# Patient Record
Sex: MALE | Race: WHITE | NOT HISPANIC OR LATINO | Employment: FULL TIME | URBAN - METROPOLITAN AREA
[De-identification: names, ages, dates, MRNs, and addresses within clinical notes are randomized per-mention and may not be internally consistent; named-entity substitution may affect disease eponyms.]

---

## 2024-01-09 ENCOUNTER — HOSPITAL ENCOUNTER (INPATIENT)
Facility: CLINIC | Age: 49
LOS: 1 days | Discharge: HOME | DRG: 282 | End: 2024-01-10
Attending: EMERGENCY MEDICINE | Admitting: INTERNAL MEDICINE
Payer: COMMERCIAL

## 2024-01-09 ENCOUNTER — APPOINTMENT (OUTPATIENT)
Dept: CT IMAGING | Facility: CLINIC | Age: 49
DRG: 282 | End: 2024-01-09
Attending: EMERGENCY MEDICINE
Payer: COMMERCIAL

## 2024-01-09 DIAGNOSIS — R55 VASOVAGAL SYNCOPE: ICD-10-CM

## 2024-01-09 DIAGNOSIS — I46.9 CARDIAC ARREST (H): ICD-10-CM

## 2024-01-09 DIAGNOSIS — I21.4 NSTEMI (NON-ST ELEVATED MYOCARDIAL INFARCTION) (H): ICD-10-CM

## 2024-01-09 DIAGNOSIS — R79.89 ELEVATED TROPONIN: Primary | ICD-10-CM

## 2024-01-09 LAB
ALBUMIN SERPL BCG-MCNC: 4.4 G/DL (ref 3.5–5.2)
ALP SERPL-CCNC: 64 U/L (ref 40–150)
ALT SERPL W P-5'-P-CCNC: 25 U/L (ref 0–70)
ANION GAP SERPL CALCULATED.3IONS-SCNC: 12 MMOL/L (ref 7–15)
AST SERPL W P-5'-P-CCNC: 26 U/L (ref 0–45)
BASOPHILS # BLD AUTO: 0 10E3/UL (ref 0–0.2)
BASOPHILS NFR BLD AUTO: 0 %
BILIRUB SERPL-MCNC: 0.8 MG/DL
BUN SERPL-MCNC: 18.3 MG/DL (ref 6–20)
CALCIUM SERPL-MCNC: 9.2 MG/DL (ref 8.6–10)
CHLORIDE SERPL-SCNC: 104 MMOL/L (ref 98–107)
CREAT SERPL-MCNC: 1.01 MG/DL (ref 0.67–1.17)
DEPRECATED HCO3 PLAS-SCNC: 27 MMOL/L (ref 22–29)
EGFRCR SERPLBLD CKD-EPI 2021: >90 ML/MIN/1.73M2
EOSINOPHIL # BLD AUTO: 0 10E3/UL (ref 0–0.7)
EOSINOPHIL NFR BLD AUTO: 0 %
ERYTHROCYTE [DISTWIDTH] IN BLOOD BY AUTOMATED COUNT: 12.5 % (ref 10–15)
GLUCOSE SERPL-MCNC: 130 MG/DL (ref 70–99)
HCT VFR BLD AUTO: 45.7 % (ref 40–53)
HGB BLD-MCNC: 15.7 G/DL (ref 13.3–17.7)
HOLD SPECIMEN: NORMAL
IMM GRANULOCYTES # BLD: 0 10E3/UL
IMM GRANULOCYTES NFR BLD: 0 %
LACTATE SERPL-SCNC: 1.6 MMOL/L (ref 0.7–2)
LYMPHOCYTES # BLD AUTO: 0.6 10E3/UL (ref 0.8–5.3)
LYMPHOCYTES NFR BLD AUTO: 5 %
MCH RBC QN AUTO: 30.7 PG (ref 26.5–33)
MCHC RBC AUTO-ENTMCNC: 34.4 G/DL (ref 31.5–36.5)
MCV RBC AUTO: 89 FL (ref 78–100)
MONOCYTES # BLD AUTO: 0.3 10E3/UL (ref 0–1.3)
MONOCYTES NFR BLD AUTO: 2 %
NEUTROPHILS # BLD AUTO: 10.9 10E3/UL (ref 1.6–8.3)
NEUTROPHILS NFR BLD AUTO: 93 %
NRBC # BLD AUTO: 0 10E3/UL
NRBC BLD AUTO-RTO: 0 /100
PLATELET # BLD AUTO: 208 10E3/UL (ref 150–450)
POTASSIUM SERPL-SCNC: 3.8 MMOL/L (ref 3.4–5.3)
PROT SERPL-MCNC: 6.6 G/DL (ref 6.4–8.3)
RBC # BLD AUTO: 5.12 10E6/UL (ref 4.4–5.9)
SODIUM SERPL-SCNC: 143 MMOL/L (ref 135–145)
TROPONIN T SERPL HS-MCNC: 159 NG/L
TROPONIN T SERPL HS-MCNC: 451 NG/L
TSH SERPL DL<=0.005 MIU/L-ACNC: 1.2 UIU/ML (ref 0.3–4.2)
WBC # BLD AUTO: 11.9 10E3/UL (ref 4–11)

## 2024-01-09 PROCEDURE — 120N000001 HC R&B MED SURG/OB

## 2024-01-09 PROCEDURE — 250N000013 HC RX MED GY IP 250 OP 250 PS 637: Performed by: INTERNAL MEDICINE

## 2024-01-09 PROCEDURE — 84443 ASSAY THYROID STIM HORMONE: CPT | Performed by: INTERNAL MEDICINE

## 2024-01-09 PROCEDURE — 83605 ASSAY OF LACTIC ACID: CPT | Performed by: EMERGENCY MEDICINE

## 2024-01-09 PROCEDURE — 250N000011 HC RX IP 250 OP 636: Performed by: EMERGENCY MEDICINE

## 2024-01-09 PROCEDURE — 84484 ASSAY OF TROPONIN QUANT: CPT | Performed by: EMERGENCY MEDICINE

## 2024-01-09 PROCEDURE — 85025 COMPLETE CBC W/AUTO DIFF WBC: CPT | Performed by: EMERGENCY MEDICINE

## 2024-01-09 PROCEDURE — 71275 CT ANGIOGRAPHY CHEST: CPT

## 2024-01-09 PROCEDURE — 250N000009 HC RX 250: Performed by: EMERGENCY MEDICINE

## 2024-01-09 PROCEDURE — 87637 SARSCOV2&INF A&B&RSV AMP PRB: CPT | Performed by: INTERNAL MEDICINE

## 2024-01-09 PROCEDURE — 250N000013 HC RX MED GY IP 250 OP 250 PS 637: Performed by: EMERGENCY MEDICINE

## 2024-01-09 PROCEDURE — 93005 ELECTROCARDIOGRAM TRACING: CPT

## 2024-01-09 PROCEDURE — 80053 COMPREHEN METABOLIC PANEL: CPT | Performed by: EMERGENCY MEDICINE

## 2024-01-09 PROCEDURE — 99222 1ST HOSP IP/OBS MODERATE 55: CPT | Performed by: INTERNAL MEDICINE

## 2024-01-09 PROCEDURE — 36415 COLL VENOUS BLD VENIPUNCTURE: CPT | Performed by: EMERGENCY MEDICINE

## 2024-01-09 PROCEDURE — 99285 EMERGENCY DEPT VISIT HI MDM: CPT | Mod: 25

## 2024-01-09 PROCEDURE — 258N000003 HC RX IP 258 OP 636: Performed by: INTERNAL MEDICINE

## 2024-01-09 RX ORDER — BACLOFEN 10 MG/1
20 TABLET ORAL 4 TIMES DAILY
Status: DISCONTINUED | OUTPATIENT
Start: 2024-01-09 | End: 2024-01-10 | Stop reason: HOSPADM

## 2024-01-09 RX ORDER — VENLAFAXINE HYDROCHLORIDE 75 MG/1
75 CAPSULE, EXTENDED RELEASE ORAL DAILY
Status: DISCONTINUED | OUTPATIENT
Start: 2024-01-10 | End: 2024-01-10 | Stop reason: HOSPADM

## 2024-01-09 RX ORDER — BISACODYL 10 MG
10 SUPPOSITORY, RECTAL RECTAL DAILY PRN
Status: DISCONTINUED | OUTPATIENT
Start: 2024-01-09 | End: 2024-01-10 | Stop reason: HOSPADM

## 2024-01-09 RX ORDER — ACETAMINOPHEN 325 MG/1
650 TABLET ORAL EVERY 4 HOURS PRN
Status: DISCONTINUED | OUTPATIENT
Start: 2024-01-09 | End: 2024-01-10 | Stop reason: HOSPADM

## 2024-01-09 RX ORDER — HEPARIN SODIUM 10000 [USP'U]/100ML
0-5000 INJECTION, SOLUTION INTRAVENOUS CONTINUOUS
Status: DISCONTINUED | OUTPATIENT
Start: 2024-01-09 | End: 2024-01-10 | Stop reason: HOSPADM

## 2024-01-09 RX ORDER — BACLOFEN 20 MG/1
20 TABLET ORAL 4 TIMES DAILY
COMMUNITY

## 2024-01-09 RX ORDER — ACETAMINOPHEN 650 MG/1
650 SUPPOSITORY RECTAL EVERY 4 HOURS PRN
Status: DISCONTINUED | OUTPATIENT
Start: 2024-01-09 | End: 2024-01-10 | Stop reason: HOSPADM

## 2024-01-09 RX ORDER — CALCIUM CARBONATE 500 MG/1
1000 TABLET, CHEWABLE ORAL 4 TIMES DAILY PRN
Status: DISCONTINUED | OUTPATIENT
Start: 2024-01-09 | End: 2024-01-10 | Stop reason: HOSPADM

## 2024-01-09 RX ORDER — SODIUM CHLORIDE 9 MG/ML
INJECTION, SOLUTION INTRAVENOUS CONTINUOUS
Status: DISCONTINUED | OUTPATIENT
Start: 2024-01-09 | End: 2024-01-10 | Stop reason: HOSPADM

## 2024-01-09 RX ORDER — TADALAFIL 5 MG/1
5 TABLET ORAL EVERY 24 HOURS
COMMUNITY

## 2024-01-09 RX ORDER — LIDOCAINE 40 MG/G
CREAM TOPICAL
Status: DISCONTINUED | OUTPATIENT
Start: 2024-01-09 | End: 2024-01-10 | Stop reason: HOSPADM

## 2024-01-09 RX ORDER — ATORVASTATIN CALCIUM 10 MG/1
10 TABLET, FILM COATED ORAL EVERY EVENING
Status: DISCONTINUED | OUTPATIENT
Start: 2024-01-09 | End: 2024-01-10 | Stop reason: HOSPADM

## 2024-01-09 RX ORDER — AMOXICILLIN 250 MG
1 CAPSULE ORAL 2 TIMES DAILY PRN
Status: DISCONTINUED | OUTPATIENT
Start: 2024-01-09 | End: 2024-01-10 | Stop reason: HOSPADM

## 2024-01-09 RX ORDER — ONDANSETRON 4 MG/1
4 TABLET, ORALLY DISINTEGRATING ORAL EVERY 6 HOURS PRN
Status: DISCONTINUED | OUTPATIENT
Start: 2024-01-09 | End: 2024-01-10 | Stop reason: HOSPADM

## 2024-01-09 RX ORDER — IOPAMIDOL 755 MG/ML
500 INJECTION, SOLUTION INTRAVASCULAR ONCE
Status: COMPLETED | OUTPATIENT
Start: 2024-01-09 | End: 2024-01-09

## 2024-01-09 RX ORDER — HYDROMORPHONE HCL IN WATER/PF 6 MG/30 ML
0.4 PATIENT CONTROLLED ANALGESIA SYRINGE INTRAVENOUS
Status: DISCONTINUED | OUTPATIENT
Start: 2024-01-09 | End: 2024-01-10 | Stop reason: HOSPADM

## 2024-01-09 RX ORDER — HEPARIN SODIUM 10000 [USP'U]/100ML
0-5000 INJECTION, SOLUTION INTRAVENOUS CONTINUOUS
Status: DISCONTINUED | OUTPATIENT
Start: 2024-01-09 | End: 2024-01-09

## 2024-01-09 RX ORDER — VENLAFAXINE HYDROCHLORIDE 75 MG/1
75 CAPSULE, EXTENDED RELEASE ORAL DAILY
COMMUNITY

## 2024-01-09 RX ORDER — ASPIRIN 81 MG/1
81 TABLET ORAL DAILY
Status: DISCONTINUED | OUTPATIENT
Start: 2024-01-10 | End: 2024-01-10 | Stop reason: HOSPADM

## 2024-01-09 RX ORDER — ASPIRIN 81 MG/1
324 TABLET, CHEWABLE ORAL ONCE
Status: COMPLETED | OUTPATIENT
Start: 2024-01-09 | End: 2024-01-09

## 2024-01-09 RX ORDER — HYDROMORPHONE HCL IN WATER/PF 6 MG/30 ML
0.2 PATIENT CONTROLLED ANALGESIA SYRINGE INTRAVENOUS
Status: DISCONTINUED | OUTPATIENT
Start: 2024-01-09 | End: 2024-01-10 | Stop reason: HOSPADM

## 2024-01-09 RX ORDER — AMOXICILLIN 250 MG
2 CAPSULE ORAL 2 TIMES DAILY PRN
Status: DISCONTINUED | OUTPATIENT
Start: 2024-01-09 | End: 2024-01-10 | Stop reason: HOSPADM

## 2024-01-09 RX ORDER — ONDANSETRON 2 MG/ML
4 INJECTION INTRAMUSCULAR; INTRAVENOUS EVERY 6 HOURS PRN
Status: DISCONTINUED | OUTPATIENT
Start: 2024-01-09 | End: 2024-01-10 | Stop reason: HOSPADM

## 2024-01-09 RX ORDER — HYDROMORPHONE HYDROCHLORIDE 2 MG/1
2 TABLET ORAL EVERY 4 HOURS PRN
Status: DISCONTINUED | OUTPATIENT
Start: 2024-01-09 | End: 2024-01-10 | Stop reason: HOSPADM

## 2024-01-09 RX ADMIN — IOPAMIDOL 69 ML: 755 INJECTION, SOLUTION INTRAVENOUS at 18:42

## 2024-01-09 RX ADMIN — SODIUM CHLORIDE: 9 INJECTION, SOLUTION INTRAVENOUS at 23:15

## 2024-01-09 RX ADMIN — ASPIRIN 81 MG CHEWABLE TABLET 324 MG: 81 TABLET CHEWABLE at 20:32

## 2024-01-09 RX ADMIN — HEPARIN SODIUM 900 UNITS/HR: 10000 INJECTION, SOLUTION INTRAVENOUS at 20:47

## 2024-01-09 RX ADMIN — TIZANIDINE 8 MG: 4 TABLET ORAL at 23:14

## 2024-01-09 RX ADMIN — BACLOFEN 20 MG: 10 TABLET ORAL at 23:14

## 2024-01-09 RX ADMIN — ATORVASTATIN CALCIUM 10 MG: 10 TABLET, FILM COATED ORAL at 23:14

## 2024-01-09 RX ADMIN — SODIUM CHLORIDE 89 ML: 9 INJECTION, SOLUTION INTRAVENOUS at 18:43

## 2024-01-09 ASSESSMENT — ACTIVITIES OF DAILY LIVING (ADL)
ADLS_ACUITY_SCORE: 35

## 2024-01-09 NOTE — ED PROVIDER NOTES
"  History     Chief Complaint:  Post-op Problem     The history is provided by the patient.      Vu Alonso is a 48 year old male with history of DILMA on CPAP who presents to the ED from [facility] surgery [place] after suffering a  brief cardiac arrest. Per OP note, the patient developed severe bradycardia and had a brief period of asystole. A code blue was called. He was given 1 mg IV epi and 5 chest compressions. He promptly returned to rhythm and appeared to be breathing. Soon thereafter he received hydralazine and esmolol. Surgery resumed to close. The patient currently endorses feeling \"groggy and dry\". He notes he had some mild chest discomfort prior to this operation and was mildly hypertensive at his pre-op visit 4 days ago. He denies chest pain, shortness of breath, or scrotal pain.    Independent Historian:   None - Patient Only    Review of External Notes:   I reviewed anesthesia and operative notes from today.     Medications:    Venlafaxine  Tizanidine  Baclofen    Past Medical History:    Hydrocele  DILMA on CPAP  Kidney stones    Past Surgical History:    Left ureteral surgery  Left lithotripsy    Physical Exam   Patient Vitals for the past 24 hrs:   BP Temp Temp src Pulse Resp SpO2 Height Weight   01/09/24 1716 -- -- -- 91 18 94 % -- --   01/09/24 1715 -- -- -- 90 19 94 % -- --   01/09/24 1714 -- -- -- 96 18 96 % -- --   01/09/24 1713 -- -- -- 86 13 96 % -- --   01/09/24 1712 -- -- -- 88 11 96 % -- --   01/09/24 1659 108/60 97.7  F (36.5  C) Oral 80 20 99 % 1.676 m (5' 6\") 74.8 kg (165 lb)      Physical Exam  Nursing note and vitals reviewed.  HENT:   Mouth/Throat: Moist mucous membranes.   Eyes: EOMI, nonicteric sclera  Cardiovascular: Normal rate, regular rhythm, no murmurs, rubs, or gallops  Pulmonary/Chest: Effort normal and breath sounds normal. No respiratory distress. No wheezes. No rales.   Abdominal: Soft. Nontender, nondistended, no guarding or rigidity.   Musculoskeletal: Normal range of " motion.   Neurological: Alert. Moves all extremities spontaneously.   Skin: Skin is warm and dry. No rash noted.         Emergency Department Course   ECG  ECG results from 01/09/24   EKG 12-lead, tracing only     Value    Systolic Blood Pressure     Diastolic Blood Pressure     Ventricular Rate 82    Atrial Rate 82    WA Interval 152    QRS Duration 96        QTc 441    P Axis 60    R AXIS 79    T Axis 36    Interpretation ECG      Sinus rhythm  Incomplete right bundle branch block  Borderline ECG  No previous ECGs available           Imaging:  CT Chest Pulmonary Embolism w Contrast   Final Result   IMPRESSION:   1.  No pulmonary embolism.      2.  Mild geographic regions of groundglass infiltrates in the mid and upper lungs nonspecific could represent atypical edema.       Echocardiogram Complete    (Results Pending)      Report per radiology    Laboratory:  Labs Ordered and Resulted from Time of ED Arrival to Time of ED Departure   CBC WITH PLATELETS AND DIFFERENTIAL - Abnormal       Result Value    WBC Count 11.9 (*)     RBC Count 5.12      Hemoglobin 15.7      Hematocrit 45.7      MCV 89      MCH 30.7      MCHC 34.4      RDW 12.5      Platelet Count 208      % Neutrophils 93      % Lymphocytes 5      % Monocytes 2      % Eosinophils 0      % Basophils 0      % Immature Granulocytes 0      NRBCs per 100 WBC 0      Absolute Neutrophils 10.9 (*)     Absolute Lymphocytes 0.6 (*)     Absolute Monocytes 0.3      Absolute Eosinophils 0.0      Absolute Basophils 0.0      Absolute Immature Granulocytes 0.0      Absolute NRBCs 0.0     COMPREHENSIVE METABOLIC PANEL   TROPONIN T, HIGH SENSITIVITY   LACTIC ACID WHOLE BLOOD       Emergency Department Course & Assessments:    Interventions:  Medications   venlafaxine (EFFEXOR XR) 24 hr capsule 75 mg (has no administration in time range)   baclofen (LIORESAL) tablet 20 mg (20 mg Oral $Given 1/9/24 2314)   tiZANidine (ZANAFLEX) tablet 8 mg (8 mg Oral $Given 1/9/24 2314)    aspirin EC tablet 81 mg (has no administration in time range)   atorvastatin (LIPITOR) tablet 10 mg (10 mg Oral $Given 1/9/24 2314)   lidocaine 1 % 0.1-1 mL (has no administration in time range)   lidocaine (LMX4) cream (has no administration in time range)   sodium chloride (PF) 0.9% PF flush 3 mL (3 mLs Intracatheter Not Given 1/9/24 2306)   sodium chloride (PF) 0.9% PF flush 3 mL (has no administration in time range)   senna-docusate (SENOKOT-S/PERICOLACE) 8.6-50 MG per tablet 1 tablet (has no administration in time range)     Or   senna-docusate (SENOKOT-S/PERICOLACE) 8.6-50 MG per tablet 2 tablet (has no administration in time range)   calcium carbonate (TUMS) chewable tablet 1,000 mg (has no administration in time range)   Patient is already receiving anticoagulation with heparin, enoxaparin (LOVENOX), warfarin (COUMADIN)  or other anticoagulant medication (has no administration in time range)   sodium chloride 0.9 % infusion ( Intravenous $New Bag 1/9/24 2315)   acetaminophen (TYLENOL) tablet 650 mg (has no administration in time range)     Or   acetaminophen (TYLENOL) Suppository 650 mg (has no administration in time range)   HYDROmorphone (DILAUDID) half-tab 1 mg (has no administration in time range)   HYDROmorphone (DILAUDID) tablet 2 mg (has no administration in time range)   HYDROmorphone (DILAUDID) injection 0.2 mg (has no administration in time range)   HYDROmorphone (DILAUDID) injection 0.4 mg (has no administration in time range)   melatonin tablet 5 mg (has no administration in time range)   bisacodyl (DULCOLAX) suppository 10 mg (has no administration in time range)   ondansetron (ZOFRAN ODT) ODT tab 4 mg (has no administration in time range)     Or   ondansetron (ZOFRAN) injection 4 mg (has no administration in time range)   heparin 25,000 units in 0.45% NaCl 250 mL ANTICOAGULANT infusion (900 Units/hr Intravenous Rate/Dose Verify 1/9/24 2313)   CT Scan Flush (89 mLs Intravenous $Given 1/9/24  1843)   iopamidol (ISOVUE-370) solution 500 mL (69 mLs Intravenous $Given 1/9/24 1842)   heparin loading dose for LOW INTENSITY TREATMENT * Give BEFORE starting heparin infusion (4,500 Units Intravenous $Given 1/9/24 2048)   aspirin (ASA) chewable tablet 324 mg (324 mg Oral $Given 1/9/24 2032)          Independent Interpretation (X-rays, CTs, rhythm strip):  none.      Consultations/Discussion of Management or Tests:  I spoke with cardiology, Dr. Chahal, who agrees with initiation of heparin gtt given troponin elevations.      I spoke with Dr. Jimenez of the hospitalist team regarding the patient, who accepted the patient for admission.     Social Determinants of Health affecting care:   None    Disposition:  The patient was admitted to the hospital under the care of Dr. Jimenez.     Impression & Plan        Medical Decision Making:  Pt presents after brief cardiac arrest in outpt surgery center during a vasectomy. Pt had significant bradycardia beforehand. Documentation shows pt only had 5 chest compressions, but that he also received 1mg of epinephrine which isn't entirely consistent. Fortunately, very quickly had ROSC. Ddx includes PE, ACS, strong vagal reaction, medication side effect, among many other etiologies. Troponin elevating here, though EKG without signs of ischemia. Pt also without chest pain or dyspnea. While I doubt a cardiac event, reasonable to cover with heparin in event of ACS and I discussed with cardiology, Dr. Chahal who agrees. CT chest negative for PE, pericardial effusion, etc. Pt stable during ED course. Admission indicated for echo, cardiology consultation, etc. Pt/wife in agreement with this plan. Dr. Jimenez from our hospitalist service accepts for admission. Pt admitted in stable condition.     Diagnosis:    ICD-10-CM    1. Cardiac arrest (H)  I46.9       2. NSTEMI (non-ST elevated myocardial infarction) (H)  I21.4               Scribe Disclosure:  Daniel CHONG Hailie, am serving as a  scribe at 5:53 PM on 1/9/2024 to document services personally performed by Ruy Gabriel MD based on my observations and the provider's statements to me.          Ruy Gabriel MD  01/10/24 0344

## 2024-01-09 NOTE — ED TRIAGE NOTES
Pt was having a vasectomy and had a cardiac event that the pacu noted as asystole. Per pacu record pt was given compressions, for asystole and given 1 round of epi esmolol and hydralazine.  Pt is alert and oriented denies pain states he has mild discomfort in groin.      Triage Assessment (Adult)       Row Name 01/09/24 4100          Triage Assessment    Airway WDL WDL        Respiratory WDL    Respiratory WDL WDL        Skin Circulation/Temperature WDL    Skin Circulation/Temperature WDL WDL        Cardiac WDL    Cardiac WDL WDL     Cardiac Rhythm NSR        Cognitive/Neuro/Behavioral WDL    Cognitive/Neuro/Behavioral WDL WDL

## 2024-01-10 ENCOUNTER — TELEPHONE (OUTPATIENT)
Dept: CARDIOLOGY | Facility: CLINIC | Age: 49
End: 2024-01-10

## 2024-01-10 ENCOUNTER — APPOINTMENT (OUTPATIENT)
Dept: CARDIOLOGY | Facility: CLINIC | Age: 49
DRG: 282 | End: 2024-01-10
Attending: INTERNAL MEDICINE
Payer: COMMERCIAL

## 2024-01-10 VITALS
BODY MASS INDEX: 26.52 KG/M2 | SYSTOLIC BLOOD PRESSURE: 131 MMHG | HEART RATE: 90 BPM | HEIGHT: 66 IN | DIASTOLIC BLOOD PRESSURE: 76 MMHG | WEIGHT: 165 LBS | RESPIRATION RATE: 10 BRPM | OXYGEN SATURATION: 98 % | TEMPERATURE: 98.1 F

## 2024-01-10 LAB
ALBUMIN SERPL BCG-MCNC: 4 G/DL (ref 3.5–5.2)
ALP SERPL-CCNC: 53 U/L (ref 40–150)
ALT SERPL W P-5'-P-CCNC: 19 U/L (ref 0–70)
ANION GAP SERPL CALCULATED.3IONS-SCNC: 7 MMOL/L (ref 7–15)
AST SERPL W P-5'-P-CCNC: 22 U/L (ref 0–45)
ATRIAL RATE - MUSE: 82 BPM
BILIRUB SERPL-MCNC: 1 MG/DL
BUN SERPL-MCNC: 17.9 MG/DL (ref 6–20)
CALCIUM SERPL-MCNC: 8.5 MG/DL (ref 8.6–10)
CHLORIDE SERPL-SCNC: 103 MMOL/L (ref 98–107)
CHOLEST SERPL-MCNC: 121 MG/DL
CREAT SERPL-MCNC: 0.95 MG/DL (ref 0.67–1.17)
DEPRECATED HCO3 PLAS-SCNC: 27 MMOL/L (ref 22–29)
DIASTOLIC BLOOD PRESSURE - MUSE: NORMAL MMHG
EGFRCR SERPLBLD CKD-EPI 2021: >90 ML/MIN/1.73M2
ERYTHROCYTE [DISTWIDTH] IN BLOOD BY AUTOMATED COUNT: 12.5 % (ref 10–15)
ERYTHROCYTE [DISTWIDTH] IN BLOOD BY AUTOMATED COUNT: 12.6 % (ref 10–15)
FLUAV RNA SPEC QL NAA+PROBE: NEGATIVE
FLUBV RNA RESP QL NAA+PROBE: NEGATIVE
GLUCOSE SERPL-MCNC: 118 MG/DL (ref 70–99)
HCT VFR BLD AUTO: 41.7 % (ref 40–53)
HCT VFR BLD AUTO: 41.7 % (ref 40–53)
HDLC SERPL-MCNC: 53 MG/DL
HGB BLD-MCNC: 14.2 G/DL (ref 13.3–17.7)
HGB BLD-MCNC: 14.3 G/DL (ref 13.3–17.7)
INTERPRETATION ECG - MUSE: NORMAL
LDLC SERPL CALC-MCNC: 50 MG/DL
LVEF ECHO: NORMAL
MCH RBC QN AUTO: 30.1 PG (ref 26.5–33)
MCH RBC QN AUTO: 30.4 PG (ref 26.5–33)
MCHC RBC AUTO-ENTMCNC: 34.1 G/DL (ref 31.5–36.5)
MCHC RBC AUTO-ENTMCNC: 34.3 G/DL (ref 31.5–36.5)
MCV RBC AUTO: 89 FL (ref 78–100)
MCV RBC AUTO: 89 FL (ref 78–100)
NONHDLC SERPL-MCNC: 68 MG/DL
P AXIS - MUSE: 60 DEGREES
PLATELET # BLD AUTO: 168 10E3/UL (ref 150–450)
PLATELET # BLD AUTO: 181 10E3/UL (ref 150–450)
POTASSIUM SERPL-SCNC: 3.8 MMOL/L (ref 3.4–5.3)
PR INTERVAL - MUSE: 152 MS
PROT SERPL-MCNC: 5.7 G/DL (ref 6.4–8.3)
QRS DURATION - MUSE: 96 MS
QT - MUSE: 378 MS
QTC - MUSE: 441 MS
R AXIS - MUSE: 79 DEGREES
RBC # BLD AUTO: 4.71 10E6/UL (ref 4.4–5.9)
RBC # BLD AUTO: 4.71 10E6/UL (ref 4.4–5.9)
RSV RNA SPEC NAA+PROBE: NEGATIVE
SARS-COV-2 RNA RESP QL NAA+PROBE: NEGATIVE
SODIUM SERPL-SCNC: 137 MMOL/L (ref 135–145)
SYSTOLIC BLOOD PRESSURE - MUSE: NORMAL MMHG
T AXIS - MUSE: 36 DEGREES
TRIGL SERPL-MCNC: 89 MG/DL
UFH PPP CHRO-ACNC: 0.17 IU/ML
UFH PPP CHRO-ACNC: 0.29 IU/ML
VENTRICULAR RATE- MUSE: 82 BPM
WBC # BLD AUTO: 8.3 10E3/UL (ref 4–11)
WBC # BLD AUTO: 8.7 10E3/UL (ref 4–11)

## 2024-01-10 PROCEDURE — 80061 LIPID PANEL: CPT | Performed by: INTERNAL MEDICINE

## 2024-01-10 PROCEDURE — 85027 COMPLETE CBC AUTOMATED: CPT | Performed by: INTERNAL MEDICINE

## 2024-01-10 PROCEDURE — 93306 TTE W/DOPPLER COMPLETE: CPT

## 2024-01-10 PROCEDURE — 36415 COLL VENOUS BLD VENIPUNCTURE: CPT | Performed by: INTERNAL MEDICINE

## 2024-01-10 PROCEDURE — 93306 TTE W/DOPPLER COMPLETE: CPT | Mod: 26 | Performed by: INTERNAL MEDICINE

## 2024-01-10 PROCEDURE — 99239 HOSP IP/OBS DSCHRG MGMT >30: CPT | Performed by: STUDENT IN AN ORGANIZED HEALTH CARE EDUCATION/TRAINING PROGRAM

## 2024-01-10 PROCEDURE — 85520 HEPARIN ASSAY: CPT | Performed by: STUDENT IN AN ORGANIZED HEALTH CARE EDUCATION/TRAINING PROGRAM

## 2024-01-10 PROCEDURE — 80053 COMPREHEN METABOLIC PANEL: CPT | Performed by: INTERNAL MEDICINE

## 2024-01-10 PROCEDURE — 250N000013 HC RX MED GY IP 250 OP 250 PS 637: Performed by: INTERNAL MEDICINE

## 2024-01-10 PROCEDURE — 36415 COLL VENOUS BLD VENIPUNCTURE: CPT | Performed by: STUDENT IN AN ORGANIZED HEALTH CARE EDUCATION/TRAINING PROGRAM

## 2024-01-10 PROCEDURE — 85520 HEPARIN ASSAY: CPT | Performed by: INTERNAL MEDICINE

## 2024-01-10 PROCEDURE — 258N000003 HC RX IP 258 OP 636: Performed by: INTERNAL MEDICINE

## 2024-01-10 PROCEDURE — 99222 1ST HOSP IP/OBS MODERATE 55: CPT | Mod: 25 | Performed by: INTERNAL MEDICINE

## 2024-01-10 RX ORDER — NALOXONE HYDROCHLORIDE 0.4 MG/ML
0.2 INJECTION, SOLUTION INTRAMUSCULAR; INTRAVENOUS; SUBCUTANEOUS
Status: DISCONTINUED | OUTPATIENT
Start: 2024-01-10 | End: 2024-01-10 | Stop reason: HOSPADM

## 2024-01-10 RX ORDER — NALOXONE HYDROCHLORIDE 0.4 MG/ML
0.4 INJECTION, SOLUTION INTRAMUSCULAR; INTRAVENOUS; SUBCUTANEOUS
Status: DISCONTINUED | OUTPATIENT
Start: 2024-01-10 | End: 2024-01-10 | Stop reason: HOSPADM

## 2024-01-10 RX ADMIN — SODIUM CHLORIDE: 9 INJECTION, SOLUTION INTRAVENOUS at 11:00

## 2024-01-10 RX ADMIN — ASPIRIN 81 MG: 81 TABLET, COATED ORAL at 08:12

## 2024-01-10 RX ADMIN — BACLOFEN 20 MG: 10 TABLET ORAL at 13:07

## 2024-01-10 RX ADMIN — VENLAFAXINE HYDROCHLORIDE 75 MG: 75 CAPSULE, EXTENDED RELEASE ORAL at 08:12

## 2024-01-10 RX ADMIN — BACLOFEN 20 MG: 10 TABLET ORAL at 08:12

## 2024-01-10 ASSESSMENT — ACTIVITIES OF DAILY LIVING (ADL)
ADLS_ACUITY_SCORE: 35
ADLS_ACUITY_SCORE: 35
ADLS_ACUITY_SCORE: 37
ADLS_ACUITY_SCORE: 35

## 2024-01-10 NOTE — TELEPHONE ENCOUNTER
[facility] Call Center    Phone Message    May a detailed message be left on voicemail: yes     Reason for Call: Other: Vu called because his PCP wanted to know when it was okay to pull his drain out because he's on blood thinners.  He was told to ask cardiology about this, but he is not established.  Was seen in the ER last night and into this morning.  Please call Vu back to further advise.     Action Taken: Other: Cardiology    Travel Screening: Not Applicable    Thank you!  [facility]

## 2024-01-10 NOTE — ED NOTES
"[facility]  ED Nurse Handoff Report    ED Chief complaint: Post-op Problem  . ED Diagnosis:   Final diagnoses:   Cardiac arrest (H)   NSTEMI (non-ST elevated myocardial infarction) (H)       Allergies: No Known Allergies    Code Status: Full Code    Activity level - Baseline/Home:  independent.  Activity Level - Current:   standby.   Lift room needed: No.   Bariatric: No   Needed: No   Isolation: No.   Infection: Not Applicable.     Respiratory status: Room air    Vital Signs (within 30 minutes):   Vitals:    01/09/24 2119 01/09/24 2120 01/09/24 2121 01/09/24 2122   BP:       Pulse: (!) 124 120 (!) 123 120   Resp: (!) 35 14 22 22   Temp:       TempSrc:       SpO2: 96% 96% 96% 96%   Weight:       Height:           Cardiac Rhythm:  ,   Cardiac  Cardiac Rhythm: Normal sinus rhythm  Pain level:    Patient confused: No.   Patient Falls Risk: nonskid shoes/slippers when out of bed.   Elimination Status: Has voided     Patient Report - Initial Complaint: cardiac arrest, NSTEMI.   Focused Assessment: 48 year old male with history of DILMA on CPAP who presents to the ED from surgery for evaluation of a cardiac event during a procedure. Per OP note, the patient developed severe bradycardia and had a brief period of asystole. A code blue was called. He was given 1 mg IV epi and 5 chest compressions. He promptly returned to rhythm and appeared to be breathing. He was also given phenylephrine and ephedrine. Surgery resumed to close. The patient currently endorses feeling \"groggy and dry\". He notes he had some mild chest discomforts prior to this operation and was hypertensive at his pre-op visit 4 days ago. He denies chest pain, shortness of breath, or scrotal pain.      Abnormal Results:   Labs Ordered and Resulted from Time of ED Arrival to Time of ED Departure   COMPREHENSIVE METABOLIC PANEL - Abnormal       Result Value    Sodium 143      Potassium 3.8      Carbon Dioxide (CO2) 27      Anion " Gap 12      Urea Nitrogen 18.3      Creatinine 1.01      GFR Estimate >90      Calcium 9.2      Chloride 104      Glucose 130 (*)     Alkaline Phosphatase 64      AST 26      ALT 25      Protein Total 6.6      Albumin 4.4      Bilirubin Total 0.8     TROPONIN T, HIGH SENSITIVITY - Abnormal    Troponin T, High Sensitivity 159 (*)    CBC WITH PLATELETS AND DIFFERENTIAL - Abnormal    WBC Count 11.9 (*)     RBC Count 5.12      Hemoglobin 15.7      Hematocrit 45.7      MCV 89      MCH 30.7      MCHC 34.4      RDW 12.5      Platelet Count 208      % Neutrophils 93      % Lymphocytes 5      % Monocytes 2      % Eosinophils 0      % Basophils 0      % Immature Granulocytes 0      NRBCs per 100 WBC 0      Absolute Neutrophils 10.9 (*)     Absolute Lymphocytes 0.6 (*)     Absolute Monocytes 0.3      Absolute Eosinophils 0.0      Absolute Basophils 0.0      Absolute Immature Granulocytes 0.0      Absolute NRBCs 0.0     TROPONIN T, HIGH SENSITIVITY - Abnormal    Troponin T, High Sensitivity 451 (*)    LACTIC ACID WHOLE BLOOD - Normal    Lactic Acid 1.6     CBC WITH PLATELETS   INFLUENZA A/B, RSV, & SARS-COV2 PCR   TSH WITH FREE T4 REFLEX        CT Chest Pulmonary Embolism w Contrast   Final Result   IMPRESSION:   1.  No pulmonary embolism.      2.  Mild geographic regions of groundglass infiltrates in the mid and upper lungs nonspecific could represent atypical edema.       Echocardiogram Complete    (Results Pending)       Treatments provided: see MAR  Family Comments: involved and supportive  OBS brochure/video discussed/provided to patient:  N/A  ED Medications:   Medications   venlafaxine (EFFEXOR XR) 24 hr capsule 75 mg (has no administration in time range)   baclofen (LIORESAL) tablet 20 mg (has no administration in time range)   tiZANidine (ZANAFLEX) tablet 8 mg (has no administration in time range)   aspirin EC tablet 81 mg (has no administration in time range)   atorvastatin (LIPITOR) tablet 10 mg (has no  administration in time range)   lidocaine 1 % 0.1-1 mL (has no administration in time range)   lidocaine (LMX4) cream (has no administration in time range)   sodium chloride (PF) 0.9% PF flush 3 mL (has no administration in time range)   sodium chloride (PF) 0.9% PF flush 3 mL (has no administration in time range)   senna-docusate (SENOKOT-S/PERICOLACE) 8.6-50 MG per tablet 1 tablet (has no administration in time range)     Or   senna-docusate (SENOKOT-S/PERICOLACE) 8.6-50 MG per tablet 2 tablet (has no administration in time range)   calcium carbonate (TUMS) chewable tablet 1,000 mg (has no administration in time range)   Patient is already receiving anticoagulation with heparin, enoxaparin (LOVENOX), warfarin (COUMADIN)  or other anticoagulant medication (has no administration in time range)   sodium chloride 0.9 % infusion (has no administration in time range)   acetaminophen (TYLENOL) tablet 650 mg (has no administration in time range)     Or   acetaminophen (TYLENOL) Suppository 650 mg (has no administration in time range)   HYDROmorphone (DILAUDID) half-tab 1 mg (has no administration in time range)   HYDROmorphone (DILAUDID) tablet 2 mg (has no administration in time range)   HYDROmorphone (DILAUDID) injection 0.2 mg (has no administration in time range)   HYDROmorphone (DILAUDID) injection 0.4 mg (has no administration in time range)   melatonin tablet 5 mg (has no administration in time range)   bisacodyl (DULCOLAX) suppository 10 mg (has no administration in time range)   ondansetron (ZOFRAN ODT) ODT tab 4 mg (has no administration in time range)     Or   ondansetron (ZOFRAN) injection 4 mg (has no administration in time range)   heparin 25,000 units in 0.45% NaCl 250 mL ANTICOAGULANT infusion (has no administration in time range)   CT Scan Flush (89 mLs Intravenous $Given 1/9/24 1843)   iopamidol (ISOVUE-370) solution 500 mL (69 mLs Intravenous $Given 1/9/24 1842)   heparin loading dose for LOW INTENSITY  TREATMENT * Give BEFORE starting heparin infusion (4,500 Units Intravenous $Given 1/9/24 2048)   aspirin (ASA) chewable tablet 324 mg (324 mg Oral $Given 1/9/24 2032)       Drips infusing:  Yes  For the majority of the shift this patient was Green.   Interventions performed were see MAR.    Sepsis treatment initiated: No    Cares/treatment/interventions/medications to be completed following ED care: see inpatient orders    ED Nurse Name: Adriana Crain RN  9:45 PM

## 2024-01-10 NOTE — PLAN OF CARE
[facility]    ED Boarding Nurse Handoff Addendum Report:    Date/time: 1/10/2024, 5:52 AM    Activity Level: assist of 1    Fall Risk: Yes:  patient and family education    Active Infusions: Heparin/ Normal saline     Current Meds Due: None     Current care needs: monitor surgical site, monitor patient     Oxygen requirements (liters/min and/or FiO2): 0    Respiratory status: Room air    Vital signs (within last 30 minutes):    Vitals:    01/10/24 0030 01/10/24 0031 01/10/24 0032 01/10/24 0426   BP:    104/69   Pulse: 108 112 112 91   Resp: 14 11 19 18   Temp:    97.8  F (36.6  C)   TempSrc:    Oral   SpO2: 98% 98% 98% 98%   Weight:       Height:           Focused assessment within last 30 minutes:    Surgical groin site dressed and covered with dried blood it appears, patient on heparin 0400 draw within limits, patient denies pain, patient on cardiac diet, and is to have echo and cards consult today, VSS, Aox4.     ED Boarding Nurse name: Montez Canela RN

## 2024-01-10 NOTE — CONSULTS
Cardiology Consultation      Vu Alonso MRN# [id number]   YOB: 1975 Age: 48 year old   Date of Admission: 1/9/2024     Reason for consult: Bradycardia, elevated troponin           Assessment and Plan:     Atrial asystole without ventricular response consistent with robust vagal stimulation in the setting of vasectomy, patient with history of neurocardiogenic syncope when getting stitches in his finger previously  We discussed that he is prone to getting vasovagal bradycardia and syncope and that if he did not get the 5 chest compressions and 1 mg of epinephrine, he likely would have recovered with his own adrenergic compensatory response.  Atropine would likely be a better countermeasures in the future rather than the epinephrine.  He does have a mild troponin elevation that correlates with his hypotension and bradycardia but no wall motion abnormalities, no ischemia on ECG and no current symptoms.  We discussed invasive cardiac catheterization versus outpatient CT coronary angiogram.  Using shared decision-making, we opted for outpatient CT coronary angiogram and follow-up with [facility] in cardiology clinic 2 to 4 weeks.      Atypical chest pain  Not exertional, not limiting and not associated with shortness of breath.  Further evaluation with CT coronary angiogram as outpatient.    Okay for discharge with follow-up CT coronary angiogram 2 to 4 weeks with [facility] follow-up in cardiology clinic.  If any recurrent symptoms in the interim, would pursue cardiac catheterization and electrophysiology evaluation.    60 minutes spent today reviewing chart, discussing with patient and coordinating care.                 Chief Complaint:   Post-op Problem           History of Present Illness:   This patient is a 48 year old male with no previous history of coronary artery disease but mother having PCI in her 50s who presents with likely vagal response during vasectomy with atrial asystole and no ventricular  "response for a few seconds requiring 5 compressions and 1 mg epinephrine with recovery of heart rate, blood pressure and mentation.    The patient states that he has had syncope previously when he had stitches done to his finger and has had episodes with stress where he gets flushed and lightheaded consistent with neurocardiogenic syncope.    He denies any exertional chest discomfort.  He gets some atypical musculoskeletal chest discomfort on the left side but it does not stop him from exercising and there is no shortness of breath associated.    He exercises regularly without any change in his functional status.  Echocardiogram with normal LV function without wall motion abnormality.    Troponin is mildly elevated consistent with his episode of bradycardia and hypotension followed by tachycardia from the epinephrine.    ECG personally reviewed and no ST elevation or depression.  CT PE protocol negative for pulmonary embolism.  I do not see any vascular calcification on this test to my personal review.         Physical Exam:   Vitals were reviewed  Blood pressure 101/68, pulse 98, temperature 98.1  F (36.7  C), temperature source Oral, resp. rate 15, height 1.676 m (5' 6\"), weight 74.8 kg (165 lb), SpO2 98%.  Temperatures:  Current - Temp: 98.1  F (36.7  C); Max - Temp  Av.9  F (36.6  C)  Min: 97.7  F (36.5  C)  Max: 98.1  F (36.7  C)  Respiration range: Resp  Av.6  Min: 10  Max: 35  Pulse range: Pulse  Av.5  Min: 79  Max: 124  Blood pressure range: Systolic (24hrs), Av , Min:101 , Max:115   ; Diastolic (24hrs), Av, Min:58, Max:82    Pulse oximetry range: SpO2  Av.8 %  Min: 91 %  Max: 99 %    Intake/Output Summary (Last 24 hours) at 1/10/2024 1128  Last data filed at 1/10/2024 0500  Gross per 24 hour   Intake 240 ml   Output --   Net 240 ml     Constitutional:   awake, alert, cooperative, no apparent distress, and appears stated age     Eyes:   Lids and lashes normal, pupils equal, " round and reactive to light, extra ocular muscles intact, sclera clear, conjunctiva normal     Neck:   supple, symmetrical, trachea midline,      Back:   symmetric     Lungs:   Clear     Cardiovascular:   Regular, no significant murmurs     Abdomen:   non-tender     Musculoskeletal:   motor strength is 5 out of 5 all extremities bilaterally     Neurologic:   Grossly nonfocal     Skin:   normal skin color, texture, turgor     Additional findings:                    Past Medical History:   I have reviewed this patient's past medical history  Past Medical History:   Diagnosis Date    ACNE NEC              Past Surgical History:   I have reviewed this patient's past surgical history  Past Surgical History:   Procedure Laterality Date    EXPLORATION OF KIDNEY  1979    one kidney affected by infection, non-functional               Social History:   I have reviewed this patient's social history  Social History     Tobacco Use    Smoking status: Never    Smokeless tobacco: Not on file   Substance Use Topics    Alcohol use: No             Family History:   I have reviewed this patient's family history  Family History   Problem Relation Age of Onset    C.A.D. Mother         when 50 ys old    Depression Brother         and ADD             Allergies:   No Known Allergies          Medications:   I have reviewed this patient's current medications  (Not in a hospital admission)    Current Facility-Administered Medications Ordered in Epic   Medication Dose Route Frequency Last Rate Last Admin    acetaminophen (TYLENOL) tablet 650 mg  650 mg Oral Q4H PRN        Or    acetaminophen (TYLENOL) Suppository 650 mg  650 mg Rectal Q4H PRN        aspirin EC tablet 81 mg  81 mg Oral Daily   81 mg at 01/10/24 0812    atorvastatin (LIPITOR) tablet 10 mg  10 mg Oral QPM   10 mg at 01/09/24 2314    baclofen (LIORESAL) tablet 20 mg  20 mg Oral 4x Daily   20 mg at 01/10/24 0812    bisacodyl (DULCOLAX) suppository 10 mg  10 mg Rectal Daily PRN         calcium carbonate (TUMS) chewable tablet 1,000 mg  1,000 mg Oral 4x Daily PRN        heparin 25,000 units in 0.45% NaCl 250 mL ANTICOAGULANT infusion  0-5,000 Units/hr Intravenous Continuous 9 mL/hr at 01/10/24 1113 900 Units/hr at 01/10/24 1113    HYDROmorphone (DILAUDID) half-tab 1 mg  1 mg Oral Q4H PRN        HYDROmorphone (DILAUDID) injection 0.2 mg  0.2 mg Intravenous Q2H PRN        HYDROmorphone (DILAUDID) injection 0.4 mg  0.4 mg Intravenous Q2H PRN        HYDROmorphone (DILAUDID) tablet 2 mg  2 mg Oral Q4H PRN        lidocaine (LMX4) cream   Topical Q1H PRN        lidocaine 1 % 0.1-1 mL  0.1-1 mL Other Q1H PRN        melatonin tablet 5 mg  5 mg Oral At Bedtime PRN        naloxone (NARCAN) injection 0.2 mg  0.2 mg Intravenous Q2 Min PRN        Or    naloxone (NARCAN) injection 0.4 mg  0.4 mg Intravenous Q2 Min PRN        Or    naloxone (NARCAN) injection 0.2 mg  0.2 mg Intramuscular Q2 Min PRN        Or    naloxone (NARCAN) injection 0.4 mg  0.4 mg Intramuscular Q2 Min PRN        ondansetron (ZOFRAN ODT) ODT tab 4 mg  4 mg Oral Q6H PRN        Or    ondansetron (ZOFRAN) injection 4 mg  4 mg Intravenous Q6H PRN        Patient is already receiving anticoagulation with heparin, enoxaparin (LOVENOX), warfarin (COUMADIN)  or other anticoagulant medication   Does not apply Continuous PRN        senna-docusate (SENOKOT-S/PERICOLACE) 8.6-50 MG per tablet 1 tablet  1 tablet Oral BID PRN        Or    senna-docusate (SENOKOT-S/PERICOLACE) 8.6-50 MG per tablet 2 tablet  2 tablet Oral BID PRN        sodium chloride (PF) 0.9% PF flush 3 mL  3 mL Intracatheter Q8H        sodium chloride (PF) 0.9% PF flush 3 mL  3 mL Intracatheter q1 min prn        sodium chloride 0.9 % infusion   Intravenous Continuous 75 mL/hr at 01/10/24 1100 New Bag at 01/10/24 1100    tiZANidine (ZANAFLEX) tablet 8 mg  8 mg Oral At Bedtime   8 mg at 01/09/24 2314    venlafaxine (EFFEXOR XR) 24 hr capsule 75 mg  75 mg Oral Daily   75 mg at 01/10/24  0812     Current Outpatient Medications Ordered in Epic   Medication    baclofen (LIORESAL) 20 MG tablet    tadalafil (CIALIS) 5 MG tablet    tiZANidine (ZANAFLEX) 4 MG tablet    venlafaxine (EFFEXOR XR) 75 MG 24 hr capsule             Review of Systems:     The 10 point Review of Systems is negative other than noted in the HPI            Data:   All laboratory data reviewed  Results for orders placed or performed during the hospital encounter of 01/09/24 (from the past 24 hour(s))   EKG 12-lead, tracing only   Result Value Ref Range    Systolic Blood Pressure  mmHg    Diastolic Blood Pressure  mmHg    Ventricular Rate 82 BPM    Atrial Rate 82 BPM    ID Interval 152 ms    QRS Duration 96 ms     ms    QTc 441 ms    P Axis 60 degrees    R AXIS 79 degrees    T Axis 36 degrees    Interpretation ECG       Sinus rhythm  Incomplete right bundle branch block  Borderline ECG  No previous ECGs available  Unconfirmed report - interpretation of this ECG is computer generated - see medical record for final interpretation  Confirmed by - EMERGENCY ROOM, PHYSICIAN ([id number]),  DURGA RADFORD ([id number]) on 1/10/2024 6:41:29 AM     [place] [facility]    Narrative    The following orders were created for panel order [place] [facility].  Procedure                               Abnormality         Status                     ---------                               -----------         ------                     Extra Blue Top Tube[[id number]]                              Final result               Extra Red Top Tube[[id number]]                               Final result               Extra Green Top (Lithium...[[id number]]                      Final result               Extra Purple Top Tube[[id number]]                            Final result                 Please view results for these tests on the individual orders.   Extra Blue Top Tube   Result Value Ref Range    Hold Specimen JIC    Extra Red Top Tube   Result Value Ref Range    Hold Specimen  JIC    Extra Green Top (Lithium Heparin) Tube   Result Value Ref Range    Hold Specimen JIC    Extra Purple Top Tube   Result Value Ref Range    Hold Specimen JIC    CBC + differential    Narrative    The following orders were created for panel order CBC + differential.  Procedure                               Abnormality         Status                     ---------                               -----------         ------                     CBC with platelets and d...[[id number]]  Abnormal            Final result                 Please view results for these tests on the individual orders.   Comprehensive metabolic panel   Result Value Ref Range    Sodium 143 135 - 145 mmol/L    Potassium 3.8 3.4 - 5.3 mmol/L    Carbon Dioxide (CO2) 27 22 - 29 mmol/L    Anion Gap 12 7 - 15 mmol/L    Urea Nitrogen 18.3 6.0 - 20.0 mg/dL    Creatinine 1.01 0.67 - 1.17 mg/dL    GFR Estimate >90 >60 mL/min/1.73m2    Calcium 9.2 8.6 - 10.0 mg/dL    Chloride 104 98 - 107 mmol/L    Glucose 130 (H) 70 - 99 mg/dL    Alkaline Phosphatase 64 40 - 150 U/L    AST 26 0 - 45 U/L    ALT 25 0 - 70 U/L    Protein Total 6.6 6.4 - 8.3 g/dL    Albumin 4.4 3.5 - 5.2 g/dL    Bilirubin Total 0.8 <=1.2 mg/dL   Troponin T, High Sensitivity (now)   Result Value Ref Range    Troponin T, High Sensitivity 159 (HH) <=22 ng/L   CBC with platelets and differential   Result Value Ref Range    WBC Count 11.9 (H) 4.0 - 11.0 10e3/uL    RBC Count 5.12 4.40 - 5.90 10e6/uL    Hemoglobin 15.7 13.3 - 17.7 g/dL    Hematocrit 45.7 40.0 - 53.0 %    MCV 89 78 - 100 fL    MCH 30.7 26.5 - 33.0 pg    MCHC 34.4 31.5 - 36.5 g/dL    RDW 12.5 10.0 - 15.0 %    Platelet Count 208 150 - 450 10e3/uL    % Neutrophils 93 %    % Lymphocytes 5 %    % Monocytes 2 %    % Eosinophils 0 %    % Basophils 0 %    % Immature Granulocytes 0 %    NRBCs per 100 WBC 0 <1 /100    Absolute Neutrophils 10.9 (H) 1.6 - 8.3 10e3/uL    Absolute Lymphocytes 0.6 (L) 0.8 - 5.3 10e3/uL    Absolute Monocytes 0.3 0.0  - 1.3 10e3/uL    Absolute Eosinophils 0.0 0.0 - 0.7 10e3/uL    Absolute Basophils 0.0 0.0 - 0.2 10e3/uL    Absolute Immature Granulocytes 0.0 <=0.4 10e3/uL    Absolute NRBCs 0.0 10e3/uL   Lactic acid whole blood   Result Value Ref Range    Lactic Acid 1.6 0.7 - 2.0 mmol/L   CT Chest Pulmonary Embolism w Contrast    Narrative    EXAM: CT CHEST PULMONARY EMBOLISM W CONTRAST  LOCATION: [facility]  DATE: 1/9/2024    INDICATION: Brief perioperative asystolic cardiac arrest.  COMPARISON: None.  TECHNIQUE: CT chest pulmonary angiogram during arterial phase injection of IV contrast. Multiplanar reformats and MIP reconstructions were performed. Dose reduction techniques were used.   CONTRAST: 69mL Isovue 370    FINDINGS:  ANGIOGRAM CHEST: Pulmonary arteries are normal caliber and negative for pulmonary emboli. Thoracic aorta is negative for dissection. No CT evidence of right heart strain.    LUNGS AND PLEURA: There are mild central groundglass infiltrates in the mid and upper lungs with linear regions of atelectasis at the lung bases.    MEDIASTINUM/AXILLAE: Normal.    CORONARY ARTERY CALCIFICATION: None.    UPPER ABDOMEN: Normal.    MUSCULOSKELETAL: Normal.      Impression    IMPRESSION:  1.  No pulmonary embolism.    2.  Mild geographic regions of groundglass infiltrates in the mid and upper lungs nonspecific could represent atypical edema.    Troponin T, High Sensitivity (now)   Result Value Ref Range    Troponin T, High Sensitivity 451 (HH) <=22 ng/L   TSH with free T4 reflex   Result Value Ref Range    TSH 1.20 0.30 - 4.20 uIU/mL   Asymptomatic Influenza A/B, RSV, & SARS-CoV2 PCR (COVID-19) Nasopharyngeal    Specimen: Nasopharyngeal; Swab   Result Value Ref Range    Influenza A PCR Negative Negative    Influenza B PCR Negative Negative    RSV PCR Negative Negative    SARS CoV2 PCR Negative Negative    Narrative    Testing was performed using the Xpert Xpress CoV2/Flu/RSV Assay on the [employer]  GeneXpert Instrument. This test should be ordered for the detection of SARS-CoV-2, influenza, and RSV viruses in individuals who meet clinical and/or epidemiological criteria. Test performance is unknown in asymptomatic patients. This test is for in vitro diagnostic use under the FDA EUA for laboratories certified under CLIA to perform high or moderate complexity testing. This test has not been FDA cleared or approved. A negative result does not rule out the presence of PCR inhibitors in the specimen or target RNA in concentration below the limit of detection for the assay. If only one viral target is positive but coinfection with multiple targets is suspected, the sample should be re-tested with another FDA cleared, approved, or authorized test, if coinfection would change clinical management. This test was validated by the [facility]. These laboratories are certified under the Clinical Laboratory Improvement Amendments of 1988 (CLIA-88) as qualified to perform high complexity laboratory testing.   CBC with platelets   Result Value Ref Range    WBC Count 8.3 4.0 - 11.0 10e3/uL    RBC Count 4.71 4.40 - 5.90 10e6/uL    Hemoglobin 14.2 13.3 - 17.7 g/dL    Hematocrit 41.7 40.0 - 53.0 %    MCV 89 78 - 100 fL    MCH 30.1 26.5 - 33.0 pg    MCHC 34.1 31.5 - 36.5 g/dL    RDW 12.5 10.0 - 15.0 %    Platelet Count 181 150 - 450 10e3/uL   Heparin Unfractionated Anti Xa Level   Result Value Ref Range    Anti Xa Unfractionated Heparin 0.29 For Reference Range, See Comment IU/mL    Narrative    Therapeutic Range: UFH: 0.25-0.50 IU/mL for low intensity dosing,  0.30-0.70 IU/mL for high intensity dosing DVT and PE.  This test is not validated for other direct factor X inhibitors (e.g. rivaroxaban, apixaban, edoxaban, betrixaban, fondaparinux) and should not be used for monitoring of other medications.   Echocardiogram Complete   Result Value Ref Range    LVEF  60-65%     Narrative     [id number]  [id number]  [id number]  [id number]^LIONEL^AMRGA^LANA     [facility]  Echocardiography Laboratory  [address] [place] [address]     Name: JAZZY TUCKER  MRN: [id number]  : 1975  Study Date: 01/10/2024 08:12 AM  Age: 48 yrs  Gender: Male  Patient Location: [facility]  Reason For Study: MI - Acute  Ordering Physician: MARGA FLOWERS  Performed By: Luh Joseph     BSA: 1.8 m2  Height: 66 in  Weight: 165 lb  HR: 90  BP: 115/82 mmHg  ______________________________________________________________________________  ______________________________________________________________________________  Interpretation Summary     Left ventricular systolic function is normal.  The visual ejection fraction is 60-65%.  No regional wall motion abnormalities noted.  The study was technically adequate. There is no comparison study available.  ______________________________________________________________________________  Left Ventricle  The left ventricle is normal in size. There is normal left ventricular wall  thickness. Left ventricular systolic function is normal. The visual ejection  fraction is 60-65%. Left ventricular diastolic function is normal. No regional  wall motion abnormalities noted.     Right Ventricle  The right ventricle is normal size. The right ventricular systolic function is  normal.     Atria  Normal left atrial size. Right atrial size is normal.     Mitral Valve  There is trace mitral regurgitation.     Tricuspid Valve  There is trace tricuspid regurgitation. The right ventricular systolic  pressure is approximated at 18.2 mmHg plus the right atrial pressure. IVC  diameter <2.1 cm collapsing >50% with sniff suggests a normal RA pressure of 3  mmHg.     Aortic Valve  The aortic valve is trileaflet. No aortic regurgitation is present. No aortic  stenosis is present.     Pulmonic Valve  The pulmonic valve is not well seen, but is grossly normal.     Pericardium  There is  no pericardial effusion.     Rhythm  Sinus rhythm was noted.     ______________________________________________________________________________  MMode/2D Measurements & Calculations  IVSd: 0.88 cm  LVIDd: 4.6 cm  LVIDs: 2.5 cm  LVPWd: 0.99 cm  IVC diam: 1.9 cm  FS: 44.8 %     LV mass(C)d: 143.7 grams  LV mass(C)dI: 78.0 grams/m2  Ao root diam: 3.5 cm  LA dimension: 3.4 cm  asc Aorta Diam: 3.1 cm  LA/Ao: 0.97  LVOT diam: 2.4 cm  LVOT area: 4.4 cm2  Ao root diam index Ht(cm/m): 2.1  Ao root diam index BSA (cm/m2): 1.9  Asc Ao diam index BSA (cm/m2): 1.7  Asc Ao diam index Ht(cm/m): 1.8  LA Volume (BP): 44.3 ml  LA Volume Index (BP): 24.1 ml/m2     RV Base: 3.6 cm  RWT: 0.43  TAPSE: 1.9 cm     Doppler Measurements & Calculations  MV E max noah: 64.4 cm/sec  MV A max noah: 53.8 cm/sec  MV E/A: 1.2  MV max P.1 mmHg  MV mean P.1 mmHg  MV V2 VTI: 18.6 cm  MV dec time: 0.14 sec  PA acc time: 0.08 sec  TR max noah: 213.3 cm/sec  TR max P.2 mmHg  E/E' av.2  Lateral E/e': 4.8  Medial E/e': 5.7  RV S Noah: 13.9 cm/sec     ______________________________________________________________________________  Report approved by: Stephanie Ribeiro 01/10/2024 10:14 AM         Comprehensive metabolic panel   Result Value Ref Range    Sodium 137 135 - 145 mmol/L    Potassium 3.8 3.4 - 5.3 mmol/L    Carbon Dioxide (CO2) 27 22 - 29 mmol/L    Anion Gap 7 7 - 15 mmol/L    Urea Nitrogen 17.9 6.0 - 20.0 mg/dL    Creatinine 0.95 0.67 - 1.17 mg/dL    GFR Estimate >90 >60 mL/min/1.73m2    Calcium 8.5 (L) 8.6 - 10.0 mg/dL    Chloride 103 98 - 107 mmol/L    Glucose 118 (H) 70 - 99 mg/dL    Alkaline Phosphatase 53 40 - 150 U/L    AST 22 0 - 45 U/L    ALT 19 0 - 70 U/L    Protein Total 5.7 (L) 6.4 - 8.3 g/dL    Albumin 4.0 3.5 - 5.2 g/dL    Bilirubin Total 1.0 <=1.2 mg/dL   CBC with platelets   Result Value Ref Range    WBC Count 8.7 4.0 - 11.0 10e3/uL    RBC Count 4.71 4.40 - 5.90 10e6/uL    Hemoglobin 14.3 13.3 - 17.7 g/dL    Hematocrit  "41.7 40.0 - 53.0 %    MCV 89 78 - 100 fL    MCH 30.4 26.5 - 33.0 pg    MCHC 34.3 31.5 - 36.5 g/dL    RDW 12.6 10.0 - 15.0 %    Platelet Count 168 150 - 450 10e3/uL       No results found for: \"CHOL\"  No results found for: \"HDL\"  No results found for: \"LDL\"  No results found for: \"TRIG\"  No results found for: \"CHOLHDLRATIO\"  TSH   Date Value Ref Range Status   01/09/2024 1.20 0.30 - 4.20 uIU/mL Final         EKG results:    Echocardiology:    Cardiac stress testing:    Cardiac angiography:    Clinically Significant Risk Factors Present on Admission                       # Overweight: Estimated body mass index is 26.63 kg/m  as calculated from the following:    Height as of this encounter: 1.676 m (5' 6\").    Weight as of this encounter: 74.8 kg (165 lb).         "

## 2024-01-10 NOTE — PHARMACY-ADMISSION MEDICATION HISTORY
Pharmacy Intern Admission Medication History    Admission medication history is complete. The information provided in this note is only as accurate as the sources available at the time of the update.    Information Source(s): Patient via in-person    Pertinent Information: NA    Changes made to PTA medication list:  Added: ALL  Deleted: ALL  Changed: None    Medication Affordability:  Not including over the counter (OTC) medications, was there a time in the past 3 months when you did not take your medications as prescribed because of cost?: No    Allergies reviewed with patient and updates made in EHR: yes    Medication History Completed By: Tita Bowen 1/9/2024 8:36 PM    PTA Med List   Medication Sig Last Dose    baclofen (LIORESAL) 20 MG tablet Take 20 mg by mouth 4 times daily 1/9/2024 at 1800    tadalafil (CIALIS) 5 MG tablet Take 5 mg by mouth every 24 hours 1/8/2024 at 0800    tiZANidine (ZANAFLEX) 4 MG tablet Take 8 mg by mouth daily 1/8/2024 at 2300    venlafaxine (EFFEXOR XR) 75 MG 24 hr capsule Take 75 mg by mouth daily 1/9/2024 at 1200

## 2024-01-10 NOTE — TELEPHONE ENCOUNTER
Called back and spoke with pt. Dr. Dixon consulted while pt was in the ER today, pt had brief cardiac arrest during hydrocele procedure and vasectomy yesterday. Pt stated his urologist Dr. Savanah Aguilar at [facility] [employer] was hoping to pull his drain tomorrow morning but advised pt to check with Cardiology if there is any concern for bleeding due to him receiving blood thinners in the ER. Per notes pt was on a heparin drip. Will review with Dr. Dixon.
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
## 2024-01-10 NOTE — PROGRESS NOTES
Notified dr. Hayes of some small new bleeding at surgery site. Marked site with marker to be able to see if new bleeding advances.

## 2024-01-11 ENCOUNTER — TELEPHONE (OUTPATIENT)
Dept: CARDIOLOGY | Facility: CLINIC | Age: 49
End: 2024-01-11
Payer: COMMERCIAL

## 2024-01-11 NOTE — TELEPHONE ENCOUNTER
Patient was admitted to [facility] on 1/9/24 who presents with likely vagal response during vasectomy with atrial asystole and no ventricular response for a few seconds requiring 5 compressions and 1 mg Epinephrine with recovery of heart rate, blood pressure and mentation. Mild troponin elevation that correlates with his hypotension and bradycardia.    PMH: no previous history of coronary artery disease but mother having PCI in her 50's, syncope previously when he had stitches.     1/9/24: Echo showed EF of 60-65%. No regional wall motion abnormalities noted.    Cardiology consulted and recommend OP CTA.    Called patient to discuss any post hospital d/c questions he may have and confirm f/u appts.      Patient denied any SOB, chest pain, or light headedness.    RN confirmed with patient that he needs to be scheduled for CTA and cardiology OV as ordered. Scheduling and Dr. Dixon's Team RN phone number provided.    Patient advised to call clinic with any cardiac related questions or concerns prior to this shekhar't. Patient verbalized understanding and agreed with plan. JACOB Avalos RN.

## 2024-01-11 NOTE — TELEPHONE ENCOUNTER
Received response from Dr. Dixon:     Marshal Dixon MD Hair, Ashley W RN  Caller: Unspecified (Yesterday,  1:55 PM)  Can pull anytime. He s not on thinners now. They might want to consider giving him 0.25mg or 0.5mg of atropine before drain pull to prevent vasovagal.    Called back to pt and communicated recommendations. Pt verbalized understanding.
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
## 2024-01-16 ENCOUNTER — TELEPHONE (OUTPATIENT)
Dept: CARDIOLOGY | Facility: CLINIC | Age: 49
End: 2024-01-16
Payer: COMMERCIAL

## 2024-01-16 NOTE — TELEPHONE ENCOUNTER
Called pt, no answer. Left VM requesting call back to Team 4 [phone].     Addendum 1/16/24 - Received return call from pt, pt confirmed he would like his CTA coronary angiogram done at [facility] if possible.     Called to [facility] and spoke with Radiology scheduling, per staff we can fax order to [phone]. Faxed order. I

## 2024-01-16 NOTE — TELEPHONE ENCOUNTER
[facility] Call Center    Phone Message    May a detailed message be left on voicemail: yes     Reason for Call: Other: patient is calling and would like to have the CT Angiogram coronary arterydone at [facility] instead of [facility], please advise, thank you.     Action Taken: Other: cardiology    Travel Screening: Not Applicable  Thank you!  [facility]

## 2024-11-09 ENCOUNTER — HEALTH MAINTENANCE LETTER (OUTPATIENT)
Age: 49
End: 2024-11-09